# Patient Record
Sex: MALE | URBAN - METROPOLITAN AREA
[De-identification: names, ages, dates, MRNs, and addresses within clinical notes are randomized per-mention and may not be internally consistent; named-entity substitution may affect disease eponyms.]

---

## 2023-06-12 ENCOUNTER — TELEPHONE ENCOUNTER (OUTPATIENT)
Dept: URBAN - METROPOLITAN AREA CLINIC 35 | Facility: CLINIC | Age: 28
End: 2023-06-12

## 2023-06-12 ENCOUNTER — OFFICE VISIT (OUTPATIENT)
Dept: URBAN - METROPOLITAN AREA CLINIC 35 | Facility: CLINIC | Age: 28
End: 2023-06-12
Payer: COMMERCIAL

## 2023-06-12 ENCOUNTER — DASHBOARD ENCOUNTERS (OUTPATIENT)
Age: 28
End: 2023-06-12

## 2023-06-12 VITALS
WEIGHT: 189.8 LBS | DIASTOLIC BLOOD PRESSURE: 80 MMHG | SYSTOLIC BLOOD PRESSURE: 114 MMHG | OXYGEN SATURATION: 99 % | HEART RATE: 89 BPM

## 2023-06-12 DIAGNOSIS — R11.0 NAUSEA: ICD-10-CM

## 2023-06-12 DIAGNOSIS — R68.81 EARLY SATIETY: ICD-10-CM

## 2023-06-12 DIAGNOSIS — R63.0 LOSS OF APPETITE: ICD-10-CM

## 2023-06-12 DIAGNOSIS — R14.0 ABDOMINAL BLOATING: ICD-10-CM

## 2023-06-12 PROBLEM — 79890006: Status: ACTIVE | Noted: 2023-06-12

## 2023-06-12 PROCEDURE — 99204 OFFICE O/P NEW MOD 45 MIN: CPT | Performed by: PHYSICIAN ASSISTANT

## 2023-06-12 RX ORDER — FLUOXETINE HYDROCHLORIDE 20 MG/1
CAPSULE ORAL
Qty: 90 CAPSULE | Status: ACTIVE | COMMUNITY

## 2023-06-12 RX ORDER — GABAPENTIN 300 MG/1
TAKE 1 CAPSULE BY MOUTH NIGHTLY AS NEEDED CAPSULE ORAL
Qty: 30 EACH | Refills: 0 | Status: ACTIVE | COMMUNITY

## 2023-06-12 RX ORDER — ALBUTEROL SULFATE 90 UG/1
1 PUFF AS NEEDED AEROSOL, METERED RESPIRATORY (INHALATION)
Status: ACTIVE | COMMUNITY

## 2023-06-12 RX ORDER — DEXTROAMPHETAMINE SACCHARATE, AMPHETAMINE ASPARTATE, DEXTROAMPHETAMINE SULFATE AND AMPHETAMINE SULFATE 5; 5; 5; 5 MG/1; MG/1; MG/1; MG/1
TAKE 1 TABLET BY MOUTH TWICE A DAY TABLET ORAL
Qty: 60 EACH | Refills: 0 | Status: ACTIVE | COMMUNITY

## 2023-06-12 RX ORDER — PREDNISOLONE ACETATE 10 MG/ML
PUT 1 DROP OY 4 TIMES A DAY FOR 7 DAYS, THEN TWICE DAILY FOR SECOND WEEK SUSPENSION/ DROPS OPHTHALMIC
Qty: 5 MILLILITER | Refills: 0 | Status: ACTIVE | COMMUNITY

## 2023-06-12 NOTE — HPI-NAUSEA
27 y/o male patient presents today for a consultation of nausea, bloating. He admits recent onset was a month ago and he admits to early satiety.  He never feels hungry. Patient admits associated symptoms such as loss of appetite, dizziness, abdominal pain, bloating. Denies belching, faintness, dry mouth, diarrhea, fever, and decreased urination. Patient currently admits 1-2 soft bowel movements per day, denies blood, mucus, or melena in stools. Patient admit to taking Zofran 4 mg PRN with some relief of symptoms.  He denies emesis, no heartburn. No abd pain.  No weight loss.  He denies previous EGD or colonoscopy.

## 2023-06-12 NOTE — PHYSICAL EXAM RECTAL:
normal tone, no external hemorrhoids, no masses palpable, no red blood, Tenderness on MARANDA, Internal hemorrhoids present

## 2023-06-20 ENCOUNTER — OFFICE VISIT (OUTPATIENT)
Dept: URBAN - METROPOLITAN AREA SURGERY CENTER 8 | Facility: SURGERY CENTER | Age: 28
End: 2023-06-20

## 2023-07-14 ENCOUNTER — OFFICE VISIT (OUTPATIENT)
Dept: URBAN - METROPOLITAN AREA CLINIC 35 | Facility: CLINIC | Age: 28
End: 2023-07-14

## 2023-10-12 ENCOUNTER — TELEPHONE ENCOUNTER (OUTPATIENT)
Dept: URBAN - METROPOLITAN AREA SURGERY CENTER 8 | Facility: SURGERY CENTER | Age: 28
End: 2023-10-12

## 2023-10-17 ENCOUNTER — OFFICE VISIT (OUTPATIENT)
Dept: URBAN - METROPOLITAN AREA SURGERY CENTER 8 | Facility: SURGERY CENTER | Age: 28
End: 2023-10-17
Payer: COMMERCIAL

## 2023-10-17 ENCOUNTER — CLAIMS CREATED FROM THE CLAIM WINDOW (OUTPATIENT)
Dept: URBAN - METROPOLITAN AREA CLINIC 4 | Facility: CLINIC | Age: 28
End: 2023-10-17
Payer: COMMERCIAL

## 2023-10-17 DIAGNOSIS — K20.0 ALLERGIC EOSINOPHILIC ESOPHAGITIS: ICD-10-CM

## 2023-10-17 DIAGNOSIS — K29.60 ADENOPAPILLOMATOSIS GASTRICA: ICD-10-CM

## 2023-10-17 DIAGNOSIS — K31.9 ERYTHEMA OF GASTRIC ANTRUM: ICD-10-CM

## 2023-10-17 DIAGNOSIS — K29.80 ACUTE DUODENITIS: ICD-10-CM

## 2023-10-17 DIAGNOSIS — R14.0 BLOATING: ICD-10-CM

## 2023-10-17 DIAGNOSIS — K29.81 DUODENITIS WITH BLEEDING: ICD-10-CM

## 2023-10-17 DIAGNOSIS — R68.81 EARLY SATIETY: ICD-10-CM

## 2023-10-17 PROCEDURE — 88342 IMHCHEM/IMCYTCHM 1ST ANTB: CPT | Performed by: PATHOLOGY

## 2023-10-17 PROCEDURE — 43239 EGD BIOPSY SINGLE/MULTIPLE: CPT | Performed by: INTERNAL MEDICINE

## 2023-10-17 PROCEDURE — G8907 PT DOC NO EVENTS ON DISCHARG: HCPCS | Performed by: INTERNAL MEDICINE

## 2023-10-17 PROCEDURE — 88305 TISSUE EXAM BY PATHOLOGIST: CPT | Performed by: PATHOLOGY

## 2023-10-17 PROCEDURE — 00731 ANES UPR GI NDSC PX NOS: CPT | Performed by: NURSE ANESTHETIST, CERTIFIED REGISTERED

## 2023-10-17 RX ORDER — DEXTROAMPHETAMINE SACCHARATE, AMPHETAMINE ASPARTATE, DEXTROAMPHETAMINE SULFATE AND AMPHETAMINE SULFATE 5; 5; 5; 5 MG/1; MG/1; MG/1; MG/1
TAKE 1 TABLET BY MOUTH TWICE A DAY TABLET ORAL
Qty: 60 EACH | Refills: 0 | Status: ACTIVE | COMMUNITY

## 2023-10-17 RX ORDER — GABAPENTIN 300 MG/1
TAKE 1 CAPSULE BY MOUTH NIGHTLY AS NEEDED CAPSULE ORAL
Qty: 30 EACH | Refills: 0 | Status: ACTIVE | COMMUNITY

## 2023-10-17 RX ORDER — ALBUTEROL SULFATE 90 UG/1
1 PUFF AS NEEDED AEROSOL, METERED RESPIRATORY (INHALATION)
Status: ACTIVE | COMMUNITY

## 2023-10-17 RX ORDER — FLUOXETINE HYDROCHLORIDE 20 MG/1
CAPSULE ORAL
Qty: 90 CAPSULE | Status: ACTIVE | COMMUNITY

## 2023-10-17 RX ORDER — PREDNISOLONE ACETATE 10 MG/ML
PUT 1 DROP OY 4 TIMES A DAY FOR 7 DAYS, THEN TWICE DAILY FOR SECOND WEEK SUSPENSION/ DROPS OPHTHALMIC
Qty: 5 MILLILITER | Refills: 0 | Status: ACTIVE | COMMUNITY

## 2023-11-07 ENCOUNTER — OFFICE VISIT (OUTPATIENT)
Dept: URBAN - METROPOLITAN AREA CLINIC 35 | Facility: CLINIC | Age: 28
End: 2023-11-07

## 2023-11-07 RX ORDER — PREDNISOLONE ACETATE 10 MG/ML
PUT 1 DROP OY 4 TIMES A DAY FOR 7 DAYS, THEN TWICE DAILY FOR SECOND WEEK SUSPENSION/ DROPS OPHTHALMIC
Qty: 5 MILLILITER | Refills: 0 | Status: ACTIVE | COMMUNITY

## 2023-11-07 RX ORDER — GABAPENTIN 300 MG/1
TAKE 1 CAPSULE BY MOUTH NIGHTLY AS NEEDED CAPSULE ORAL
Qty: 30 EACH | Refills: 0 | Status: ACTIVE | COMMUNITY

## 2023-11-07 RX ORDER — DEXTROAMPHETAMINE SACCHARATE, AMPHETAMINE ASPARTATE, DEXTROAMPHETAMINE SULFATE AND AMPHETAMINE SULFATE 5; 5; 5; 5 MG/1; MG/1; MG/1; MG/1
TAKE 1 TABLET BY MOUTH TWICE A DAY TABLET ORAL
Qty: 60 EACH | Refills: 0 | Status: ACTIVE | COMMUNITY

## 2023-11-07 RX ORDER — FLUOXETINE HYDROCHLORIDE 20 MG/1
CAPSULE ORAL
Qty: 90 CAPSULE | Status: ACTIVE | COMMUNITY

## 2023-11-07 RX ORDER — ALBUTEROL SULFATE 90 UG/1
1 PUFF AS NEEDED AEROSOL, METERED RESPIRATORY (INHALATION)
Status: ACTIVE | COMMUNITY

## 2023-11-07 NOTE — HPI-NAUSEA
Admits/Denies continued episodes of nausea and bloating since his last visit. Admits/Denies taking Zofran prn. Admits/Denies eating small frequent meals.   Last visit: 27 y/o male patient presents today for a consultation of nausea, bloating. He admits recent onset was a month ago and he admits to early satiety.  He never feels hungry. Patient admits associated symptoms such as loss of appetite, dizziness, abdominal pain, bloating. Denies belching, faintness, dry mouth, diarrhea, fever, and decreased urination. Patient currently admits 1-2 soft bowel movements per day, denies blood, mucus, or melena in stools. Patient admit to taking Zofran 4 mg PRN with some relief of symptoms.  He denies emesis, no heartburn. No abd pain.  No weight loss.  He denies previous EGD or colonoscopy.

## 2023-11-07 NOTE — HPI-ENDOSCOPY (EGD) FOLLOWUP
28 Year old male patient presents today for a follow up from his Endoscopy. He admits/denies any complications after his procedure.  Endoscopy Report: - Z-line regular.  Biopsied - Erythematous mucosa in the antrum.  - Normal examined duodenum Duodenum, Second Part (D2), Biopsy: PEPTIC DUODENITIS. Stomach, Antrum and Body, Biopsy: CHRONIC GASTRITIS, NON-SPECIFIC. Esophagus, Lower-Third, Biopsy: SQUAMOUS MUCOSA WITH MARKED BASAL CELL HYPERPLASIA AND NUMEROUS           INTRAEPITHELIAL EOSINOPHILS (20-30/HPF), CONSISTENT WITH EOSINOPHILIC           ESOPHAGITIS.